# Patient Record
Sex: MALE | Race: WHITE | NOT HISPANIC OR LATINO | RURAL
[De-identification: names, ages, dates, MRNs, and addresses within clinical notes are randomized per-mention and may not be internally consistent; named-entity substitution may affect disease eponyms.]

---

## 2017-08-02 ENCOUNTER — OFFICE VISIT (OUTPATIENT)
Dept: RETAIL CLINIC | Facility: CLINIC | Age: 47
End: 2017-08-02

## 2017-08-02 VITALS — WEIGHT: 191 LBS | BODY MASS INDEX: 27.35 KG/M2 | HEIGHT: 70 IN

## 2017-08-02 DIAGNOSIS — M54.50 ACUTE BILATERAL LOW BACK PAIN WITHOUT SCIATICA: Primary | ICD-10-CM

## 2017-08-02 PROCEDURE — 99202 OFFICE O/P NEW SF 15 MIN: CPT | Performed by: NURSE PRACTITIONER

## 2017-08-02 RX ORDER — CYCLOBENZAPRINE HCL 10 MG
10 TABLET ORAL 3 TIMES DAILY PRN
Qty: 90 TABLET | Refills: 0 | Status: SHIPPED | OUTPATIENT
Start: 2017-08-02

## 2017-08-02 RX ORDER — OMEPRAZOLE 40 MG/1
40 CAPSULE, DELAYED RELEASE ORAL DAILY
COMMUNITY

## 2017-08-02 RX ORDER — NAPROXEN 500 MG/1
500 TABLET ORAL 2 TIMES DAILY WITH MEALS
Qty: 60 TABLET | Refills: 0 | Status: SHIPPED | OUTPATIENT
Start: 2017-08-02

## 2017-08-02 NOTE — PATIENT INSTRUCTIONS
Back Pain, Adult  Back pain is very common in adults. The cause of back pain is rarely dangerous and the pain often gets better over time. The cause of your back pain may not be known. Some common causes of back pain include:  · Strain of the muscles or ligaments supporting the spine.  · Wear and tear (degeneration) of the spinal disks.  · Arthritis.  · Direct injury to the back.  For many people, back pain may return. Since back pain is rarely dangerous, most people can learn to manage this condition on their own.  HOME CARE INSTRUCTIONS  Watch your back pain for any changes. The following actions may help to lessen any discomfort you are feeling:  · Remain active. It is stressful on your back to sit or  one place for long periods of time. Do not sit, drive, or  one place for more than 30 minutes at a time. Take short walks on even surfaces as soon as you are able. Try to increase the length of time you walk each day.  · Exercise regularly as directed by your health care provider. Exercise helps your back heal faster. It also helps avoid future injury by keeping your muscles strong and flexible.  · Do not stay in bed. Resting more than 1-2 days can delay your recovery.  · Pay attention to your body when you bend and lift. The most comfortable positions are those that put less stress on your recovering back. Always use proper lifting techniques, including:    Bending your knees.    Keeping the load close to your body.    Avoiding twisting.  · Find a comfortable position to sleep. Use a firm mattress and lie on your side with your knees slightly bent. If you lie on your back, put a pillow under your knees.  · Avoid feeling anxious or stressed. Stress increases muscle tension and can worsen back pain. It is important to recognize when you are anxious or stressed and learn ways to manage it, such as with exercise.  · Take medicines only as directed by your health care provider. Over-the-counter  medicines to reduce pain and inflammation are often the most helpful. Your health care provider may prescribe muscle relaxant drugs. These medicines help dull your pain so you can more quickly return to your normal activities and healthy exercise.  · Apply ice to the injured area:    Put ice in a plastic bag.    Place a towel between your skin and the bag.    Leave the ice on for 20 minutes, 2-3 times a day for the first 2-3 days. After that, ice and heat may be alternated to reduce pain and spasms.  · Maintain a healthy weight. Excess weight puts extra stress on your back and makes it difficult to maintain good posture.  SEEK MEDICAL CARE IF:  · You have pain that is not relieved with rest or medicine.  · You have increasing pain going down into the legs or buttocks.  · You have pain that does not improve in one week.  · You have night pain.  · You lose weight.  · You have a fever or chills.  SEEK IMMEDIATE MEDICAL CARE IF:   · You develop new bowel or bladder control problems.  · You have unusual weakness or numbness in your arms or legs.  · You develop nausea or vomiting.  · You develop abdominal pain.  · You feel faint.     This information is not intended to replace advice given to you by your health care provider. Make sure you discuss any questions you have with your health care provider.     Document Released: 12/18/2006 Document Revised: 01/08/2016 Document Reviewed: 04/21/2015  [employer] Patient Education ©2017 [employer]

## 2017-08-02 NOTE — PROGRESS NOTES
"Subjective   Luan Bell is a 46 y.o. male.     Back Pain   This is a new problem. Episode onset: 5 days. The problem occurs constantly. The problem has been gradually worsening since onset. The pain is present in the lumbar spine. The quality of the pain is described as aching. The pain does not radiate. The pain is severe. The pain is the same all the time. The symptoms are aggravated by bending and standing. Stiffness is present all day. Pertinent negatives include no abdominal pain, dysuria, leg pain, numbness, perianal numbness, tingling or weakness. Risk factors include sedentary lifestyle. He has tried NSAIDs for the symptoms. The treatment provided mild relief.        The following portions of the patient's history were reviewed and updated as appropriate: allergies, current medications, past medical history, past social history, past surgical history and problem list.    Review of Systems   Constitutional: Negative.    Respiratory: Negative.    Cardiovascular: Negative.    Gastrointestinal: Negative.  Negative for abdominal pain.   Genitourinary: Negative for dysuria.   Musculoskeletal: Positive for back pain.   Neurological: Negative for tingling, weakness and numbness.   Hematological: Negative.    Psychiatric/Behavioral: Negative.         Ht 70\" (177.8 cm)  Wt 191 lb (86.6 kg)  BMI 27.41 kg/m2     Objective   Physical Exam   Constitutional: He is oriented to person, place, and time. He appears well-developed and well-nourished. No distress.   Musculoskeletal:        Lumbar back: He exhibits decreased range of motion, tenderness (negative straight leg raise), bony tenderness, pain (over lumbar spine and bilat spinal accessory muscles) and spasm. He exhibits no swelling, no edema and no deformity.   Neurological: He is alert and oriented to person, place, and time. He has normal strength and normal reflexes.   Skin: Skin is warm and dry.   Psychiatric: He has a normal mood and affect. His behavior is " normal. Thought content normal.   Vitals reviewed.      Assessment/Plan   Luan was seen today for back pain.    Diagnoses and all orders for this visit:    Acute bilateral low back pain without sciatica  -     naproxen (NAPROSYN) 500 MG tablet; Take 1 tablet by mouth 2 (Two) Times a Day With Meals.  -     cyclobenzaprine (FLEXERIL) 10 MG tablet; Take 1 tablet by mouth 3 (Three) Times a Day As Needed for Muscle Spasms.

## 2023-02-20 ENCOUNTER — TELEPHONE (OUTPATIENT)
Dept: FAMILY MEDICINE CLINIC | Facility: CLINIC | Age: 53
End: 2023-02-20

## 2023-11-03 PROBLEM — N28.1 BILATERAL RENAL CYSTS: Status: ACTIVE | Noted: 2023-11-03

## 2023-11-03 PROBLEM — K57.30 SIGMOID DIVERTICULOSIS: Status: ACTIVE | Noted: 2023-11-03

## 2023-11-03 PROBLEM — N52.9 ERECTILE DYSFUNCTION: Status: ACTIVE | Noted: 2023-11-03

## 2023-11-03 PROBLEM — G47.33 OSA (OBSTRUCTIVE SLEEP APNEA): Status: ACTIVE | Noted: 2023-11-03

## 2023-11-03 PROBLEM — N40.0 BPH (BENIGN PROSTATIC HYPERPLASIA): Status: ACTIVE | Noted: 2023-11-03

## 2023-11-03 PROBLEM — E66.3 OVERWEIGHT: Status: ACTIVE | Noted: 2023-11-03

## 2023-11-03 PROBLEM — K21.9 GERD (GASTROESOPHAGEAL REFLUX DISEASE): Status: ACTIVE | Noted: 2023-11-03

## 2023-11-03 PROBLEM — F17.210 CIGARETTE SMOKER: Status: ACTIVE | Noted: 2023-11-03

## 2023-11-03 PROBLEM — E03.8 SUBCLINICAL HYPOTHYROIDISM: Status: ACTIVE | Noted: 2023-11-03

## 2025-03-06 ENCOUNTER — OFFICE VISIT (OUTPATIENT)
Dept: FAMILY MEDICINE CLINIC | Facility: CLINIC | Age: 55
End: 2025-03-06
Payer: COMMERCIAL

## 2025-03-06 VITALS
WEIGHT: 173 LBS | HEART RATE: 73 BPM | OXYGEN SATURATION: 96 % | DIASTOLIC BLOOD PRESSURE: 92 MMHG | BODY MASS INDEX: 24.77 KG/M2 | HEIGHT: 70 IN | TEMPERATURE: 97.6 F | SYSTOLIC BLOOD PRESSURE: 148 MMHG

## 2025-03-06 DIAGNOSIS — N52.9 ERECTILE DYSFUNCTION, UNSPECIFIED ERECTILE DYSFUNCTION TYPE: ICD-10-CM

## 2025-03-06 DIAGNOSIS — R45.4 IRRITABILITY: ICD-10-CM

## 2025-03-06 DIAGNOSIS — Z13.29 SCREENING FOR THYROID DISORDER: ICD-10-CM

## 2025-03-06 DIAGNOSIS — Z11.59 NEED FOR HEPATITIS C SCREENING TEST: ICD-10-CM

## 2025-03-06 DIAGNOSIS — K08.9 POOR DENTITION: ICD-10-CM

## 2025-03-06 DIAGNOSIS — E78.5 DYSLIPIDEMIA: ICD-10-CM

## 2025-03-06 DIAGNOSIS — F34.1 DYSTHYMIA: ICD-10-CM

## 2025-03-06 DIAGNOSIS — Z13.1 SCREENING FOR DIABETES MELLITUS: ICD-10-CM

## 2025-03-06 DIAGNOSIS — K21.9 GASTROESOPHAGEAL REFLUX DISEASE WITHOUT ESOPHAGITIS: ICD-10-CM

## 2025-03-06 DIAGNOSIS — Z12.11 SCREEN FOR COLON CANCER: ICD-10-CM

## 2025-03-06 DIAGNOSIS — Z12.2 SCREENING FOR LUNG CANCER: ICD-10-CM

## 2025-03-06 DIAGNOSIS — E55.9 VITAMIN D DEFICIENCY: ICD-10-CM

## 2025-03-06 DIAGNOSIS — Z00.01 ENCOUNTER FOR GENERAL ADULT MEDICAL EXAMINATION WITH ABNORMAL FINDINGS: Primary | ICD-10-CM

## 2025-03-06 DIAGNOSIS — F41.9 ANXIETY: ICD-10-CM

## 2025-03-06 DIAGNOSIS — Z12.5 SCREENING FOR PROSTATE CANCER: ICD-10-CM

## 2025-03-06 DIAGNOSIS — R03.0 ELEVATED BLOOD PRESSURE READING IN OFFICE WITHOUT DIAGNOSIS OF HYPERTENSION: ICD-10-CM

## 2025-03-06 DIAGNOSIS — G47.33 OSA (OBSTRUCTIVE SLEEP APNEA): ICD-10-CM

## 2025-03-06 DIAGNOSIS — Z01.818 PREOP EXAMINATION: ICD-10-CM

## 2025-03-06 DIAGNOSIS — F17.210 CIGARETTE SMOKER: ICD-10-CM

## 2025-03-06 PROBLEM — K57.30 SIGMOID DIVERTICULOSIS: Status: RESOLVED | Noted: 2023-11-03 | Resolved: 2025-03-06

## 2025-03-06 PROBLEM — N40.0 BPH (BENIGN PROSTATIC HYPERPLASIA): Status: RESOLVED | Noted: 2023-11-03 | Resolved: 2025-03-06

## 2025-03-06 PROBLEM — N28.1 BILATERAL RENAL CYSTS: Status: RESOLVED | Noted: 2023-11-03 | Resolved: 2025-03-06

## 2025-03-06 PROBLEM — E03.8 SUBCLINICAL HYPOTHYROIDISM: Status: RESOLVED | Noted: 2023-11-03 | Resolved: 2025-03-06

## 2025-03-06 PROBLEM — E66.3 OVERWEIGHT: Status: RESOLVED | Noted: 2023-11-03 | Resolved: 2025-03-06

## 2025-03-06 RX ORDER — TADALAFIL 5 MG/1
5 TABLET ORAL DAILY PRN
Qty: 90 TABLET | Refills: 3 | Status: SHIPPED | OUTPATIENT
Start: 2025-03-06

## 2025-03-06 NOTE — ASSESSMENT & PLAN NOTE
Reports good control of symptoms taking omeprazole 20 mg daily.  Has recurrent symptoms when he stops.  Discussed avoiding GI irritants including tobacco, caffeine, NSAIDs and spicy foods.  Does not drink significant prema of alcohol

## 2025-03-06 NOTE — ASSESSMENT & PLAN NOTE
Longstanding erectile dysfunction, historically has had good clinical response to Viagra but does not like logistics of having to plan for use as well as delayed benefit.  Discussed option of daily medication, initiating trial of Cialis 5 mg daily.  Advise if not benefiting though I did explain to patient there are not really a lot of other options available with phosphodiesterase 5 inhibitors, other than Trimix injections.  Indicates no difficulties with libido, thus will not obtain testosterone level at this time

## 2025-03-06 NOTE — ASSESSMENT & PLAN NOTE
Discussed need for appropriate hygiene with regular brushing and flossing.  Patient's girlfriend is reportedly hygienist and will be setting him up for formal dental consultation.

## 2025-03-06 NOTE — ASSESSMENT & PLAN NOTE
Describes for several months now having irritability, anxiousness with some dysthymia associate with fatigue malaise and insomnia the latter likely also contributed to by his untreated sleep apnea.  No known trigger.  No SI/HI, no history of illicit drug use or alcohol abuse.  Initiate trial of sertraline 50 mg at 0.5 tablets or 25 mg daily x 6 days then increasing 1 tablet of 50 mg daily, with delayed onset in benefits and early potential onset and side effects discussed.  Reassess clinical response in 6 weeks.

## 2025-03-06 NOTE — ASSESSMENT & PLAN NOTE
Patient ongoing 0.75 pack/day cigarette smoker x 40 years for total of 30 pack years.  Discussed benefits of low-dose screening chest CT in terms of potential mortality reduction due to early detection of lung cancer.  Strongly recommend he obtain study and he is agreeable with referral given.  Also strongly recommended smoking cessation.

## 2025-03-06 NOTE — ASSESSMENT & PLAN NOTE
Diagnosed SOPHY, not currently on CPAP, past due for sleep medicine evaluation.  Patient reports having witnessed apnea and snoring by his girlfriend.  Refer to Dr. Tari Stephen of sleep medicine for further evaluation.

## 2025-03-06 NOTE — ASSESSMENT & PLAN NOTE
54-year-old male presenting for complete physical with specific health issues being addressed as detailed below, health maintenance includes referral given for low-dose screening chest CT along with screening colonoscopy, patient declining recommended Tdap Shingrix and COVID-19 vaccines despite counseling regarding efficacy and safety, EKG performed today normal, update screening labs which will be obtained at a later date given unable to draw today given the late hour of the appointment.  Plan follow-up in 6 weeks to assess clinical response to the sertraline as detailed below

## 2025-03-06 NOTE — ASSESSMENT & PLAN NOTE
Current 0.75 pack/day cigarette smoker x 40 years for a total of 30-pack-year history, unfortunately not interested at this time in assistance with smoking cessation.  He has been made well aware of the risks of his smoking habit including multiple cancers, COPD, and increased risk of cardiovascular disease with statistically reduced life expectancy.  May consider stopping at a future date.

## 2025-03-06 NOTE — ASSESSMENT & PLAN NOTE
Mild stage II blood pressure acutely with no history of formal hypertension.  EKG unremarkable.  Simply follow-up with testing next visit.  Pursue healthy lifestyle diet and exercise along with smoking cessation recommendation

## 2025-03-06 NOTE — PROGRESS NOTES
Male Physical Note      Date: 2025   Patient Name: Luan Bell  : 1970   MRN: [id number]     Chief Complaint:    Chief Complaint   Patient presents with    Annual Exam       History of Present Illness: Luan Bell is a 54 y.o. male who is here today for their annual health maintenance and physical, as well as to reestablish as a patient with last visit over 4 years ago.  Patient relates several issues to discuss, 1 of which is noting over the last several months simply some anxiousness with irritability, fatigue, some lack of interest, no specific trigger, having some perceived secondary difficulty with sleeping with frequent wakening.  No significant prior related history in the past.  No relationship or financial problems.  No SI/HI.  Also notes a history of longstanding ED with normal libido, noting he has taken Viagra 20 mg at 3 tablets daily as needed, but does not like delayed onset of medication and specific planning required no he does get a good clinical response.  Wonders if there is any other options.  He also has a history of SOPHY not currently treated with a CPAP machine, his girlfriend reportedly noting that he does have snoring as well as what appears to be apneic episodes likely compromising his sleep additionally.  GERD well treated with omeprazole, having recurrent symptoms if he discontinues, longstanding 40-year cigarette smoker approximately 0.75 packs/day ongoing, not yet ready to stop.  Also notes occasionally he will have some morning stiffness in several of his joints but this rapidly resolves and is not perceived to be a problem.  He does have poor dentition and his girlfriend is a hygienist attempting to get him into see the dentist for evaluation.  No other acute problems or concerns.  Review of systems with no cardiopulmonary complaints otherwise as above, and otherwise unremarkable.  Health maintenance includes need for initial low to screening chest CT and  colonoscopy, patient declining recommended Tdap Shingrix and COVID-19 vaccines, EKG today obtained for preoperative valuation colonoscopy normal, obtain screening labs      Subjective      Review of Systems:   Review of Systems    Past Medical History, Social History, Family History and Care Team were all reviewed with patient and updated as appropriate.     Medications:     Current Outpatient Medications:     omeprazole (priLOSEC) 40 MG capsule, Take 40 mg by mouth Daily. (Patient taking differently: Take 20 mg by mouth Daily. Taking 20 mg), Disp: , Rfl:     sertraline (Zoloft) 50 MG tablet, 0.5 tablets orally once daily x 6 days then 1 tablet daily, Disp: 30 tablet, Rfl: 1    tadalafil (Cialis) 5 MG tablet, Take 1 tablet by mouth Daily As Needed for Erectile Dysfunction., Disp: 90 tablet, Rfl: 3    Allergies:   No Known Allergies    Immunizations:  Health Maintenance Summary            Overdue - Pneumococcal Vaccine 50+ (1 of 2 - PCV) Never done      No completion, postpone, or frequency change history exists for this topic.              Overdue - TDAP/TD VACCINES (1 - Tdap) Never done      No completion, postpone, or frequency change history exists for this topic.              Ordered - HEPATITIS C SCREENING (Once) Ordered on 3/6/2025      No completion, postpone, or frequency change history exists for this topic.              Overdue - ZOSTER VACCINE (1 of 2) Never done      No completion, postpone, or frequency change history exists for this topic.              Ordered - LUNG CANCER SCREENING (Yearly) Ordered on 3/6/2025      No completion, postpone, or frequency change history exists for this topic.              Postponed - COVID-19 Vaccine (3 - 2024-25 season) Postponed until 3/8/2025      03/06/2025  Postponed until 3/8/2025 by Jessica Moctezuma (Patient Refused - refused)    05/07/2021  Imm Admin: COVID-19 (MODERNA) 1st,2nd,3rd Dose Monovalent    03/30/2021  Imm Admin: COVID-19 (MODERNA) 1st,2nd,3rd Dose  "Monovalent              Postponed - INFLUENZA VACCINE (Yearly - July to March) Postponed until 3/31/2025      03/06/2025  Postponed until 3/31/2025 by Jessica Moctezuma (Patient Refused - refused)              ANNUAL PHYSICAL (Yearly) Next due on 3/6/2026      03/06/2025  Done              COLORECTAL CANCER SCREENING (COLONOSCOPY - Every 10 Years) Order placed this encounter      02/05/2020  SCANNED - COLONOSCOPY                    No orders of the defined types were placed in this encounter.      Colorectal Screening:   Referral given  Last Completed Colonoscopy            COLORECTAL CANCER SCREENING (COLONOSCOPY - Every 10 Years) Order placed this encounter      02/05/2020  SCANNED - COLONOSCOPY                  CT for Smoker (Age 50-80, 20pk yr within last 15 years): Referral given  Bone Density/DEXA (high risk): N/A  Hep C (Age 18-79 once): Pending  HIV (Age 15-65 once) : N/A  PSA (Over age 50, C Level Recommendation): Pending  US Aorta (For male smokers, age 65): N/A based on age  A1c: No results found for: \"HGBA1C\" pending  Lipid panel: No results found for: \"LABLIPI\" pending    The ASCVD Risk score (Tracie DK, et al., 2019) failed to calculate for the following reasons:    Cannot find a previous HDL lab    Cannot find a previous total cholesterol lab    Dermatology: N/A  Ophthalmologist: Regular checkups recommended  Dentist: Referral recommended    Tobacco Use: High Risk (3/6/2025)    Patient History     Smoking Tobacco Use: Every Day     Smokeless Tobacco Use: Never     Passive Exposure: Not on file       Social History     Substance and Sexual Activity   Alcohol Use Yes    Comment: social        Social History     Substance and Sexual Activity   Drug Use Never        Diet/Physical activity: Suboptimal diet with limited fruits and vegetables, no specific exercise regimen    Sexual health: ED, no contraception required    PHQ-2 Depression Screening  PHQ-9 Total Score: 8       Smoking Cessation:   3-10 " "mintues spent counseling Will try to cut down     Objective     Physical Exam:  Vital Signs:   Vitals:    03/06/25 1551   BP: 148/92   BP Location: Left arm   Patient Position: Sitting   Cuff Size: Adult   Pulse: 73   Temp: 97.6 °F (36.4 °C)   TempSrc: Temporal   SpO2: 96%   Weight: 78.5 kg (173 lb)   Height: 177.8 cm (70\")   PainSc: 0-No pain     Facility age limit for growth %sunita is 20 years.  Body mass index is 24.82 kg/m².     Physical Exam  Vitals and nursing note reviewed.   Constitutional:       General: He is not in acute distress.     Appearance: Normal appearance. He is normal weight. He is not ill-appearing, toxic-appearing or diaphoretic.      Comments: Healthy, NAD, alert and oriented, casually dressed but clean, good eye contact, insight intact, not overtly depressed or anxious appearing, BMI 24.8   HENT:      Head: Normocephalic and atraumatic.      Right Ear: Tympanic membrane, ear canal and external ear normal.      Left Ear: Tympanic membrane, ear canal and external ear normal.      Nose: Nose normal. No congestion or rhinorrhea.      Mouth/Throat:      Mouth: Mucous membranes are moist.      Pharynx: Oropharynx is clear.      Comments: Poor dentition with multiple missing teeth, gingivitis  Eyes:      Extraocular Movements: Extraocular movements intact.      Conjunctiva/sclera: Conjunctivae normal.      Pupils: Pupils are equal, round, and reactive to light.   Neck:      Vascular: No carotid bruit.      Comments: No periclavicular or axillary or inguinal adenopathy  Cardiovascular:      Rate and Rhythm: Normal rate and regular rhythm.      Pulses: Normal pulses.      Heart sounds: Normal heart sounds. No murmur heard.     No friction rub. No gallop.      Comments: 2+ carotids without bruits, 2+ radial pulses, 2+ femoral pulses without bruits, 2+ bipedal pulses with good perfusion and no dependent edema  Pulmonary:      Effort: Pulmonary effort is normal. No respiratory distress.      Breath " sounds: Normal breath sounds. No stridor. No wheezing, rhonchi or rales.   Chest:      Chest wall: No tenderness.   Abdominal:      General: Bowel sounds are normal. There is no distension.      Palpations: Abdomen is soft. There is no mass.      Tenderness: There is no abdominal tenderness. There is no guarding or rebound.      Hernia: No hernia is present.   Genitourinary:     Comments: Normal circumcised male, testes descended bilaterally with no nodules or tenderness, no inguinal herniation or adenopathy, rectal exam with normal sphincter tone, no rectal masses or pain, prostate palpated distally noted to be moderately enlarged smooth and nontender with no focal nodules, unremarkable for age  Musculoskeletal:         General: No swelling, tenderness, deformity or signs of injury. Normal range of motion.      Cervical back: Normal range of motion and neck supple. No rigidity or tenderness.      Right lower leg: No edema.      Left lower leg: No edema.   Lymphadenopathy:      Cervical: No cervical adenopathy.   Skin:     General: Skin is warm and dry.      Capillary Refill: Capillary refill takes less than 2 seconds.      Findings: No lesion or rash.   Neurological:      General: No focal deficit present.      Mental Status: He is alert and oriented to person, place, and time. Mental status is at baseline.      Cranial Nerves: No cranial nerve deficit.      Sensory: No sensory deficit.      Motor: No weakness.      Coordination: Coordination normal.      Gait: Gait normal.   Psychiatric:         Mood and Affect: Mood normal.         Behavior: Behavior normal.         Thought Content: Thought content normal.         Judgment: Judgment normal.          POCT Results (if applicable):   No results found for this or any previous visit.      ECG 12 Lead    Date/Time: 3/6/2025 5:55 PM  Performed by: Sam Ayala MD    Authorized by: Sam Ayala MD  Comparison: not compared with previous ECG   Previous ECG: no  previous ECG available  Comments: Normal sinus rhythm rate 69 with no abnormalities noted, no prior EKG for comparison.          Assessment / Plan      Assessment/Plan:   Diagnoses and all orders for this visit:    1. Encounter for general adult medical examination with abnormal findings (Primary)  Assessment & Plan:  54-year-old male presenting for complete physical with specific health issues being addressed as detailed below, health maintenance includes referral given for low-dose screening chest CT along with screening colonoscopy, patient declining recommended Tdap Shingrix and COVID-19 vaccines despite counseling regarding efficacy and safety, EKG performed today normal, update screening labs which will be obtained at a later date given unable to draw today given the late hour of the appointment.  Plan follow-up in 6 weeks to assess clinical response to the sertraline as detailed below    Orders:  -     TSH Rfx On Abnormal To Free T4; Future  -     CBC & Differential; Future  -     Comprehensive Metabolic Panel; Future  -     Lipid Panel; Future  -     Hemoglobin A1c; Future  -     Hepatitis C Antibody; Future  -     PSA Screen; Future  -     Vitamin D,25-Hydroxy; Future  -     Urinalysis With Culture If Indicated -; Future    2. Preop examination  Assessment & Plan:  Preop evaluation for anticipated colonoscopy under conscious sedation.  No concerning history or clinical findings, EKG unremarkable.  Overall low risk patient for this low risk procedure despite his cigarette habit obtain screening labs with referral given for colonoscopy    Orders:  -     CBC & Differential; Future  -     Comprehensive Metabolic Panel; Future  -     Urinalysis With Culture If Indicated -; Future  -     ECG 12 Lead    3. Elevated blood pressure reading in office without diagnosis of hypertension  Assessment & Plan:  Mild stage II blood pressure acutely with no history of formal hypertension.  EKG unremarkable.  Simply follow-up  with testing next visit.  Pursue healthy lifestyle diet and exercise along with smoking cessation recommendation    Orders:  -     ECG 12 Lead    4. Dyslipidemia  -     Lipid Panel; Future    5. Vitamin D deficiency  -     Vitamin D,25-Hydroxy; Future    6. Gastroesophageal reflux disease without esophagitis  Assessment & Plan:  Reports good control of symptoms taking omeprazole 20 mg daily.  Has recurrent symptoms when he stops.  Discussed avoiding GI irritants including tobacco, caffeine, NSAIDs and spicy foods.  Does not drink significant prema of alcohol      7. Cigarette smoker  Assessment & Plan:  Current 0.75 pack/day cigarette smoker x 40 years for a total of 30-pack-year history, unfortunately not interested at this time in assistance with smoking cessation.  He has been made well aware of the risks of his smoking habit including multiple cancers, COPD, and increased risk of cardiovascular disease with statistically reduced life expectancy.  May consider stopping at a future date.    Orders:  -     CT Chest Low Dose Wo; Future    8. SOPHY (obstructive sleep apnea)  Assessment & Plan:  Diagnosed SOPHY, not currently on CPAP, past due for sleep medicine evaluation.  Patient reports having witnessed apnea and snoring by his girlfriend.  Refer to Dr. Tari Stephen of sleep medicine for further evaluation.    Orders:  -     Ambulatory Referral to Cardiology    9. Anxiety  Assessment & Plan:  Describes for several months now having irritability, anxiousness with some dysthymia associate with fatigue malaise and insomnia the latter likely also contributed to by his untreated sleep apnea.  No known trigger.  No SI/HI, no history of illicit drug use or alcohol abuse.  Initiate trial of sertraline 50 mg at 0.5 tablets or 25 mg daily x 6 days then increasing 1 tablet of 50 mg daily, with delayed onset in benefits and early potential onset and side effects discussed.  Reassess clinical response in 6 weeks.    Orders:  -      sertraline (Zoloft) 50 MG tablet; 0.5 tablets orally once daily x 6 days then 1 tablet daily  Dispense: 30 tablet; Refill: 1    10. Dysthymia  Assessment & Plan:  Describes for several months now having irritability, anxiousness with some dysthymia associate with fatigue malaise and insomnia the latter likely also contributed to by his untreated sleep apnea.  No known trigger.  No SI/HI, no history of illicit drug use or alcohol abuse.  Initiate trial of sertraline 50 mg at 0.5 tablets or 25 mg daily x 6 days then increasing 1 tablet of 50 mg daily, with delayed onset in benefits and early potential onset and side effects discussed.  Reassess clinical response in 6 weeks.    Orders:  -     sertraline (Zoloft) 50 MG tablet; 0.5 tablets orally once daily x 6 days then 1 tablet daily  Dispense: 30 tablet; Refill: 1    11. Irritability  Assessment & Plan:  Describes for several months now having irritability, anxiousness with some dysthymia associate with fatigue malaise and insomnia the latter likely also contributed to by his untreated sleep apnea.  No known trigger.  No SI/HI, no history of illicit drug use or alcohol abuse.  Initiate trial of sertraline 50 mg at 0.5 tablets or 25 mg daily x 6 days then increasing 1 tablet of 50 mg daily, with delayed onset in benefits and early potential onset and side effects discussed.  Reassess clinical response in 6 weeks.    Orders:  -     sertraline (Zoloft) 50 MG tablet; 0.5 tablets orally once daily x 6 days then 1 tablet daily  Dispense: 30 tablet; Refill: 1    12. Erectile dysfunction, unspecified erectile dysfunction type  Assessment & Plan:  Longstanding erectile dysfunction, historically has had good clinical response to Viagra but does not like logistics of having to plan for use as well as delayed benefit.  Discussed option of daily medication, initiating trial of Cialis 5 mg daily.  Advise if not benefiting though I did explain to patient there are not really a lot  of other options available with phosphodiesterase 5 inhibitors, other than Trimix injections.  Indicates no difficulties with libido, thus will not obtain testosterone level at this time    Orders:  -     tadalafil (Cialis) 5 MG tablet; Take 1 tablet by mouth Daily As Needed for Erectile Dysfunction.  Dispense: 90 tablet; Refill: 3    13. Poor dentition  Assessment & Plan:  Discussed need for appropriate hygiene with regular brushing and flossing.  Patient's girlfriend is reportedly hygienist and will be setting him up for formal dental consultation.      14. Screen for colon cancer  Assessment & Plan:  Referred for initial screening colonoscopy.    Orders:  -     Ambulatory Referral For Screening Colonoscopy    15. Screening for prostate cancer  -     PSA Screen; Future    16. Screening for diabetes mellitus  -     Hemoglobin A1c; Future    17. Screening for lung cancer  Assessment & Plan:  Patient ongoing 0.75 pack/day cigarette smoker x 40 years for total of 30 pack years.  Discussed benefits of low-dose screening chest CT in terms of potential mortality reduction due to early detection of lung cancer.  Strongly recommend he obtain study and he is agreeable with referral given.  Also strongly recommended smoking cessation.    Orders:  -     CT Chest Low Dose Wo; Future    18. Screening for thyroid disorder  -     TSH Rfx On Abnormal To Free T4; Future    19. Need for hepatitis C screening test  -     Hepatitis C Antibody; Future         Healthcare Maintenance:  Counseling provided based on age appropriate USPSTF guidelines.  BMI is within normal parameters. No other follow-up for BMI required.    Luan Bell voices understanding and acceptance of this advice and will call back with any further questions or concerns. AVS with preventive healthcare tips printed for patient.     Follow Up:   No follow-ups on file.    At [facility], we believe that sharing information builds trust and better relationships. You  are receiving this note because you recently visited [facility]. It is possible you will see health information before a provider has talked with you about it. This kind of information can be easy to misunderstand. To help you fully understand what it means for your health, we urge you to discuss this note with your provider.    Sam Ayala MD  [facility]

## 2025-03-06 NOTE — ASSESSMENT & PLAN NOTE
Preop evaluation for anticipated colonoscopy under conscious sedation.  No concerning history or clinical findings, EKG unremarkable.  Overall low risk patient for this low risk procedure despite his cigarette habit obtain screening labs with referral given for colonoscopy

## 2025-03-14 ENCOUNTER — OFFICE VISIT (OUTPATIENT)
Dept: CARDIOLOGY | Facility: CLINIC | Age: 55
End: 2025-03-14
Payer: COMMERCIAL

## 2025-03-14 VITALS
SYSTOLIC BLOOD PRESSURE: 124 MMHG | WEIGHT: 169 LBS | HEART RATE: 99 BPM | BODY MASS INDEX: 24.2 KG/M2 | OXYGEN SATURATION: 97 % | HEIGHT: 70 IN | DIASTOLIC BLOOD PRESSURE: 76 MMHG

## 2025-03-14 DIAGNOSIS — G47.33 OSA (OBSTRUCTIVE SLEEP APNEA): Primary | ICD-10-CM

## 2025-03-14 PROCEDURE — 99204 OFFICE O/P NEW MOD 45 MIN: CPT | Performed by: NURSE PRACTITIONER

## 2025-03-14 NOTE — ASSESSMENT & PLAN NOTE
Patient has a baseline AHI of 43.  Patient states he has a current CPAP device that he has been using since 2020.  He states he has been using the same supplies since he got his original machine.  He states he would like to try to get a new machine through his insurance if his is greater than 5 years old.

## 2025-03-14 NOTE — PROGRESS NOTES
"    Follow-Up Sleep Consult     Date:   2025  Name: Luan Bell  :   1970  PCP: Sam Ayala MD    Chief Complaint   Patient presents with    [facility] sleep patient - neck size 15\"       Subjective     History of Present Illness  Luan Bell is a 54 y.o. male who presents today for follow-up on SOPHY.    Patient is an old/new patient of [facility].  Those records have been obtained and reviewed.  Patient states that since he was put on his PAP device in May 2020 he has not been using.  Patient states that he would like to see if he can get a new PAP device since he has had his greater than 5 years.  He would like to restart his cpap therapy.    SOPHY History:    HST 20 Baseline AHI 43    Current mask used is FFM    Device Functioning Well: No, Device is greater than 5 years old   Mask Fit Comfortable: Yes  Air Flow Comfortable: Yes  DME Helpful for Supplies: Yes  Sleep is rested: Yes        Device Download:                 The patient's relevant past medical, surgical, family, and social history reviewed and updated in Epic as appropriate.    Past Medical History:   Diagnosis Date    Acid reflux      Past Surgical History:   Procedure Laterality Date    HAND TENDON SURGERY      ; left hand lacerated tendons first through third fingers    VASECTOMY         No Known Allergies  Prior to Admission medications    Medication Sig Start Date End Date Taking? Authorizing Provider   omeprazole (priLOSEC) 40 MG capsule Take 40 mg by mouth Daily.  Patient taking differently: Take 20 mg by mouth Daily. Taking 20 mg   Yes Provider, MD Geri   sertraline (Zoloft) 50 MG tablet 0.5 tablets orally once daily x 6 days then 1 tablet daily 3/6/25  Yes Sam Ayala MD   tadalafil (Cialis) 5 MG tablet Take 1 tablet by mouth Daily As Needed for Erectile Dysfunction. 3/6/25  Yes Sam Ayala MD     History reviewed. No pertinent family history.    Objective     Vital Signs:  /76 (BP " "Location: Right arm, Patient Position: Sitting, Cuff Size: Adult)   Pulse 99   Ht 177.8 cm (70\")   Wt 76.7 kg (169 lb)   SpO2 97%   BMI 24.25 kg/m²     BMI is within normal parameters. No other follow-up for BMI required.        Physical Exam  Constitutional:       Appearance: Normal appearance.   Neurological:      General: No focal deficit present.      Mental Status: He is alert and oriented to person, place, and time.   Psychiatric:         Mood and Affect: Mood normal.         Behavior: Behavior normal.         Thought Content: Thought content normal.         Judgment: Judgment normal.         The following data was reviewed by: CLAIR Monroe on 03/14/2025:    30-day download    CS CS office note 3/17/2020 has been reviewed.  EKG 3/17/2020 has been reviewed.  HST 5/18/2020 has been reviewed         Assessment and Plan     Diagnoses and all orders for this visit:    1. SOPHY (obstructive sleep apnea) (Primary)  Assessment & Plan:  Patient has a baseline AHI of 43.  Patient states he has a current CPAP device that he has been using since 2020.  He states he has been using the same supplies since he got his original machine.  He states he would like to try to get a new machine through his insurance if his is greater than 5 years old.        Orders:  -     PAP Therapy        Report if any new/changing symptoms immediately, Sleep risks reviewed (driving, medical, sleep death, sedating agents), and Sleep hygiene discussed         Follow Up  Return in about 8 weeks (around 5/9/2025) for Compliance visit.  Patient was given instructions and counseling regarding his condition or for health maintenance advice. Please see specific information pulled into the AVS if appropriate.  "

## 2025-03-18 ENCOUNTER — RESULTS FOLLOW-UP (OUTPATIENT)
Dept: FAMILY MEDICINE CLINIC | Facility: CLINIC | Age: 55
End: 2025-03-18
Payer: COMMERCIAL

## 2025-03-18 DIAGNOSIS — F17.210 CIGARETTE SMOKER: ICD-10-CM

## 2025-03-18 DIAGNOSIS — Z12.2 SCREENING FOR LUNG CANCER: ICD-10-CM
